# Patient Record
Sex: MALE | Race: WHITE | NOT HISPANIC OR LATINO | URBAN - NONMETROPOLITAN AREA
[De-identification: names, ages, dates, MRNs, and addresses within clinical notes are randomized per-mention and may not be internally consistent; named-entity substitution may affect disease eponyms.]

---

## 2023-05-23 ENCOUNTER — OFFICE (OUTPATIENT)
Dept: URBAN - NONMETROPOLITAN AREA CLINIC 5 | Facility: CLINIC | Age: 38
End: 2023-05-23

## 2023-05-23 VITALS
HEIGHT: 69 IN | HEART RATE: 65 BPM | WEIGHT: 188 LBS | SYSTOLIC BLOOD PRESSURE: 122 MMHG | DIASTOLIC BLOOD PRESSURE: 86 MMHG

## 2023-05-23 DIAGNOSIS — R10.32 LEFT LOWER QUADRANT PAIN: ICD-10-CM

## 2023-05-23 DIAGNOSIS — R07.89 OTHER CHEST PAIN: ICD-10-CM

## 2023-05-23 DIAGNOSIS — R12 HEARTBURN: ICD-10-CM

## 2023-05-23 PROCEDURE — 99204 OFFICE O/P NEW MOD 45 MIN: CPT | Performed by: INTERNAL MEDICINE

## 2023-05-23 NOTE — SERVICENOTES
-  none of these issues are ongoing and lingering nor were they associated with any danger/alarm symptoms and so I think for now we can manage and watch him expectantly.  He is to continue making good food choices and things that will keep him at his current level of weight.  If he has any more of these chest events, particularly at this weight level, he is to call and let us know and we will then schedule for an EGD.  We talked about some alarm symptoms that, should they occur, he should notify as well since that would also prompt an EGD
-  he did not have any imaging so it is difficult to say for sure whether he experienced diverticulitis or not.  He is a little young and it took a little bit longer than normal to get improvement on antibiotics but that seems to be overall what was happening.  I have recommended he add in one or two Metamucil capsules in the evening.  Continue to keep adequate fluids on board to upwards of 64 oz per day.  If anymore issues like this happen he is to call and let us know and we will get him worked in for CT scan and consider colonoscopy
-  will see him back in 6-8 months it is just is a touch point aware things are but, as stated above, he is to let us know if there is any changes and we will get him in accordingly

## 2023-05-23 NOTE — SERVICEHPINOTES
Aristeo Lara   is a   37 yo  male   whom I am seeing as a new patient today.  I have seen his mother, Juhi, as a patient as well. Mr. Lara comes with a couple of concerns.  First is with an atypical type chest pressure and pain that happened first back in 2018.  He saw Cardiology and had a clean bill.  It was thought to be anxiety.  It came on during a time when his heart rate was also running at a higher level than usual and at the opening the fall term out at the [employer].  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  He was also running at a heavier weight for him at 205 lb.  He began to focus on stress management, and set out to get blood pressure managed with a combination of atenolol and Norvasc and so far has not had that type of pain again.     A few months ago he had few rounds of pain once again.  It was not as frequent nor lingered quite as much nor has it affected his heart rate or blood pressure but was very tight and short-lived during the time that it occurred.  It was right over the lower sternum and xiphoid area.  He has been losing some weight deliberately and eating even better since then and so far has had no more episodes of it.  It was just few events over two weeks and since that time he has been fine.  He did not note any burn nor did it radiate up into the throat or between the shoulder blades.  He did have heartburn, brief events, several years ago when he was running at a heavier weight but not since he has lost back down to where he is now.  He denies any dysphagia
br
br The other issue is related to  an odd abdominal pain that started toward the latter part of March.  It built over a few days right over the left mid upper abdomen along the oblique muscle.  Did not have any relationship with activity in seemed to get better after he would have a bowel movement.  He was seen by Dr. Alarcon and associates at [facility] and it was thought possibly related to diverticulitis.  He was put on Cipro and Flagyl for seven days and did not really notice much improvement until about two weeks later.  Slowly subsided over the next week and a half and he has not had any more problems with it again since.  He denies any bloating, swelling, or bowel changes with it.  There has been no blood in the stool.  He has no unusual high risk family history for colorectal cancer.  No imaging or labs were done.  Outside records have been received reviewed.

## 2023-12-20 ENCOUNTER — OFFICE (OUTPATIENT)
Dept: URBAN - NONMETROPOLITAN AREA CLINIC 5 | Facility: CLINIC | Age: 38
End: 2023-12-20